# Patient Record
Sex: FEMALE | Race: WHITE | HISPANIC OR LATINO | Employment: PART TIME | URBAN - METROPOLITAN AREA
[De-identification: names, ages, dates, MRNs, and addresses within clinical notes are randomized per-mention and may not be internally consistent; named-entity substitution may affect disease eponyms.]

---
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
## 2024-03-24 ENCOUNTER — HOSPITAL ENCOUNTER (EMERGENCY)
Facility: CLINIC | Age: 24
Discharge: HOME | End: 2024-03-25
Attending: EMERGENCY MEDICINE | Admitting: EMERGENCY MEDICINE

## 2024-03-24 DIAGNOSIS — J06.9 UPPER RESPIRATORY TRACT INFECTION, UNSPECIFIED TYPE: ICD-10-CM

## 2024-03-24 LAB
FLUAV RNA SPEC QL NAA+PROBE: NEGATIVE
FLUBV RNA RESP QL NAA+PROBE: NEGATIVE
GROUP A STREP BY PCR: NOT DETECTED
RSV RNA SPEC NAA+PROBE: NEGATIVE
SARS-COV-2 RNA RESP QL NAA+PROBE: NEGATIVE

## 2024-03-24 PROCEDURE — 99283 EMERGENCY DEPT VISIT LOW MDM: CPT

## 2024-03-24 PROCEDURE — 87651 STREP A DNA AMP PROBE: CPT | Performed by: EMERGENCY MEDICINE

## 2024-03-24 PROCEDURE — 87637 SARSCOV2&INF A&B&RSV AMP PRB: CPT | Performed by: EMERGENCY MEDICINE

## 2024-03-24 ASSESSMENT — COLUMBIA-SUICIDE SEVERITY RATING SCALE - C-SSRS
2. HAVE YOU ACTUALLY HAD ANY THOUGHTS OF KILLING YOURSELF IN THE PAST MONTH?: NO
6. HAVE YOU EVER DONE ANYTHING, STARTED TO DO ANYTHING, OR PREPARED TO DO ANYTHING TO END YOUR LIFE?: NO
1. IN THE PAST MONTH, HAVE YOU WISHED YOU WERE DEAD OR WISHED YOU COULD GO TO SLEEP AND NOT WAKE UP?: NO

## 2024-03-24 ASSESSMENT — ACTIVITIES OF DAILY LIVING (ADL): ADLS_ACUITY_SCORE: 35

## 2024-03-24 NOTE — Clinical Note
Ina Merida was seen and treated in our emergency department on 3/24/2024.  She may return to work on 03/27/2024.       If you have any questions or concerns, please don't hesitate to call.      Phuong Lofton MD

## 2024-03-25 VITALS
SYSTOLIC BLOOD PRESSURE: 124 MMHG | TEMPERATURE: 99.8 F | OXYGEN SATURATION: 98 % | HEIGHT: 64 IN | RESPIRATION RATE: 20 BRPM | DIASTOLIC BLOOD PRESSURE: 84 MMHG | BODY MASS INDEX: 36.14 KG/M2 | HEART RATE: 120 BPM

## 2024-03-25 RX ORDER — ALBUTEROL SULFATE 90 UG/1
2 AEROSOL, METERED RESPIRATORY (INHALATION) EVERY 6 HOURS PRN
Qty: 18 G | Refills: 0 | Status: SHIPPED | OUTPATIENT
Start: 2024-03-25

## 2024-03-25 ASSESSMENT — ACTIVITIES OF DAILY LIVING (ADL): ADLS_ACUITY_SCORE: 35

## 2024-03-25 NOTE — ED TRIAGE NOTES
Pt arrives via triage presenting with cough and sore throat that began yesterday morning. Pt reports fever at home, Advil taken at 1700.     Triage Assessment (Adult)       Row Name 03/24/24 0037          Triage Assessment    Airway WDL WDL        Respiratory WDL    Respiratory WDL cough        Cardiac WDL    Cardiac WDL WDL        Peripheral/Neurovascular WDL    Peripheral Neurovascular WDL WDL        Cognitive/Neuro/Behavioral WDL    Cognitive/Neuro/Behavioral WDL WDL

## 2024-03-25 NOTE — ED PROVIDER NOTES
"  History     Chief Complaint:  Pharyngitis     HPI   Ina Merida is a 23 year old female presenting to the emergency department for evaluation of cough, shortness of breath, and sore throat. The patient reports having a sore throat that started 3/23/24 morning that has progressed to a cough. She reports vomiting on 3/23/24, but denies vomiting on 3/24/24. She reports taking 2 Advil on 3/23/24, but denies taking any pain medication on 3/24/24. She notes the reason for her visit to the emergency department tonight is due to her symptoms not improving, as well as feeling warm.     Independent Historian:   None - Patient Only    Medications:    Albuterol   Flonase     Past Medical History:    Asthma     Past Surgical History:    None.      Physical Exam   Patient Vitals for the past 24 hrs:   BP Temp Temp src Pulse Resp SpO2 Height   03/25/24 0015 124/84 -- -- 120 -- 98 % --   03/24/24 2245 135/66 99.8  F (37.7  C) Oral (!) 142 20 99 % 1.626 m (5' 4\")        Physical Exam  General: Resting in a chair, appears comfortable  Head:  The scalp, face, and head appear normal  Ears:  Bilateral cerumen with no erythema   Mouth/Throat: Mucus membranes are moist.  No tonsillar hypertrophy or exudate  CV:  Regular rate on my exam   Resp:  Breath sounds clear and equal bilaterally    Non-labored, no retractions or accessory muscle use    No coarseness    No wheezing   GI:  Abdomen is soft, no rigidity    No tenderness to palpation  MS:  Normal motor assessment of all extremities.    Good capillary refill noted.  Skin:  No rash or lesions noted.  Neuro:   Speech is normal and fluent. No apparent deficit.  Psych: Awake. Alert.  Normal affect.      Appropriate interactions.      Emergency Department Course     Laboratory:  Labs Ordered and Resulted from Time of ED Arrival to Time of ED Departure   INFLUENZA A/B, RSV, & SARS-COV2 PCR - Normal       Result Value    Influenza A PCR Negative      Influenza B PCR Negative      RSV " PCR Negative      SARS CoV2 PCR Negative     GROUP A STREPTOCOCCUS PCR THROAT SWAB - Normal    Group A strep by PCR Not Detected              Emergency Department Course & Assessments:    Interventions:  Medications - No data to display     Assessments:  0040     I obtained history and examined the patient as noted above.       Independent Interpretation (X-rays, CTs, rhythm strip):  None    Consultations/Discussion of Management or Tests:  None        Social Determinants of Health affecting care:   None    Disposition:  The patient was discharged.     Impression & Plan      Medical Decision Making:  Ina Merida is a 23 year old female who presents for evaluation of cough, shortness of breath, and sore throat.  This is consistent with an upper respiratory tract infection.  There is no signs at this point of serious bacterial infection such as OM, RPA, epiglottitis, PTA, strep pharyngitis, pneumonia, sinusitis, meningitis, bacteremia.  Given clear lungs, no fever, no hypoxia and no respiratory distress and negative CXR the probability of bacterial pneumonia is very unlikely.   There are no gastrointestinal symptoms at this point and no signs of dehydration.  Close followup with primary care physician is indicated.  Return to ED for fever > 103, protracted vomiting, confusion.          Diagnosis:    ICD-10-CM    1. Upper respiratory tract infection, unspecified type  J06.9            Discharge Medications:  Discharge Medication List as of 3/25/2024 12:59 AM        START taking these medications    Details   !! albuterol (PROAIR HFA/PROVENTIL HFA/VENTOLIN HFA) 108 (90 Base) MCG/ACT inhaler Inhale 2 puffs into the lungs every 6 hours as needed for shortness of breath, wheezing or cough, Disp-18 g, R-0, E-PrescribePharmacy may dispense brand covered by insurance (Proair, or proventil or ventolin or generic albuterol inhaler)       !! - Potential duplicate medications found. Please discuss with provider.              Scribe Disclosure:  I, Duncan Mcintosh, am serving as a scribe at 1:03 AM on 3/25/2024 to document services personally performed by Phuong Lofton MD based on my observations and the provider's statements to me.   3/25/2024   Phuong Lofton MD Taxman, Karah M, MD  03/25/24 0523

## 2024-03-25 NOTE — DISCHARGE INSTRUCTIONS
